# Patient Record
Sex: FEMALE | Race: WHITE | NOT HISPANIC OR LATINO
[De-identification: names, ages, dates, MRNs, and addresses within clinical notes are randomized per-mention and may not be internally consistent; named-entity substitution may affect disease eponyms.]

---

## 2017-07-11 ENCOUNTER — APPOINTMENT (OUTPATIENT)
Dept: INTERNAL MEDICINE | Facility: CLINIC | Age: 72
End: 2017-07-11

## 2017-07-11 VITALS — SYSTOLIC BLOOD PRESSURE: 120 MMHG | DIASTOLIC BLOOD PRESSURE: 74 MMHG

## 2017-07-11 VITALS
DIASTOLIC BLOOD PRESSURE: 78 MMHG | HEIGHT: 67 IN | SYSTOLIC BLOOD PRESSURE: 122 MMHG | OXYGEN SATURATION: 94 % | HEART RATE: 75 BPM | BODY MASS INDEX: 24.64 KG/M2 | TEMPERATURE: 97 F | WEIGHT: 157 LBS

## 2017-07-11 DIAGNOSIS — L65.9 NONSCARRING HAIR LOSS, UNSPECIFIED: ICD-10-CM

## 2017-07-11 DIAGNOSIS — Z80.9 FAMILY HISTORY OF MALIGNANT NEOPLASM, UNSPECIFIED: ICD-10-CM

## 2017-07-11 DIAGNOSIS — L85.3 XEROSIS CUTIS: ICD-10-CM

## 2017-07-11 DIAGNOSIS — Z86.2 PERSONAL HISTORY OF DISEASES OF THE BLOOD AND BLOOD-FORMING ORGANS AND CERTAIN DISORDERS INVOLVING THE IMMUNE MECHANISM: ICD-10-CM

## 2017-07-11 DIAGNOSIS — Z83.49 FAMILY HISTORY OF OTHER ENDOCRINE, NUTRITIONAL AND METABOLIC DISEASES: ICD-10-CM

## 2017-07-11 DIAGNOSIS — Z86.73 PERSONAL HISTORY OF TRANSIENT ISCHEMIC ATTACK (TIA), AND CEREBRAL INFARCTION W/OUT RESIDUAL DEFICITS: ICD-10-CM

## 2017-07-11 DIAGNOSIS — Z86.718 PERSONAL HISTORY OF OTHER VENOUS THROMBOSIS AND EMBOLISM: ICD-10-CM

## 2017-07-11 DIAGNOSIS — Z82.5 FAMILY HISTORY OF ASTHMA AND OTHER CHRONIC LOWER RESPIRATORY DISEASES: ICD-10-CM

## 2017-07-11 DIAGNOSIS — Z86.19 PERSONAL HISTORY OF OTHER INFECTIOUS AND PARASITIC DISEASES: ICD-10-CM

## 2017-07-11 DIAGNOSIS — Z82.69 FAMILY HISTORY OF OTHER DISEASES OF THE MUSCULOSKELETAL SYSTEM AND CONNECTIVE TISSUE: ICD-10-CM

## 2017-07-11 DIAGNOSIS — Z86.711 PERSONAL HISTORY OF PULMONARY EMBOLISM: ICD-10-CM

## 2017-07-11 DIAGNOSIS — Z82.49 FAMILY HISTORY OF ISCHEMIC HEART DISEASE AND OTHER DISEASES OF THE CIRCULATORY SYSTEM: ICD-10-CM

## 2017-07-11 RX ORDER — PRAVASTATIN SODIUM 40 MG/1
40 TABLET ORAL
Qty: 90 | Refills: 1 | Status: ACTIVE | COMMUNITY

## 2017-07-11 RX ORDER — HYDROXYCHLOROQUINE SULFATE 200 MG/1
200 TABLET ORAL TWICE DAILY
Refills: 0 | Status: ACTIVE | COMMUNITY

## 2017-07-11 RX ORDER — OMEGA-3/DHA/EPA/FISH OIL 300-1000MG
1000 CAPSULE ORAL DAILY
Refills: 0 | Status: ACTIVE | COMMUNITY
Start: 2017-07-11

## 2017-07-12 PROBLEM — Z83.49 FAMILY HISTORY OF HYPOTHYROIDISM: Status: ACTIVE | Noted: 2017-07-12

## 2017-07-12 PROBLEM — Z86.718 HISTORY OF DEEP VENOUS THROMBOSIS: Status: RESOLVED | Noted: 2017-07-12 | Resolved: 2017-07-12

## 2017-07-12 PROBLEM — Z82.49 FAMILY HISTORY OF CORONARY ARTERY DISEASE: Status: ACTIVE | Noted: 2017-07-12

## 2017-07-12 PROBLEM — Z82.69 FAMILY HISTORY OF OSTEOARTHRITIS: Status: ACTIVE | Noted: 2017-07-12

## 2017-07-12 PROBLEM — L65.9 HAIR LOSS: Status: RESOLVED | Noted: 2017-07-11 | Resolved: 2017-07-12

## 2017-07-12 PROBLEM — Z86.711 HISTORY OF PULMONARY EMBOLISM: Status: RESOLVED | Noted: 2017-07-12 | Resolved: 2017-07-12

## 2017-07-12 PROBLEM — Z86.2 HISTORY OF BRUISING EASILY: Status: RESOLVED | Noted: 2017-07-11 | Resolved: 2017-07-12

## 2017-07-12 PROBLEM — Z86.19 HISTORY OF HERPES ZOSTER: Status: RESOLVED | Noted: 2017-07-12 | Resolved: 2017-07-12

## 2017-07-12 PROBLEM — Z86.73 HISTORY OF TRANSIENT CEREBRAL ISCHEMIA: Status: RESOLVED | Noted: 2017-07-12 | Resolved: 2017-07-12

## 2017-07-12 PROBLEM — L85.3 DRY SKIN: Status: RESOLVED | Noted: 2017-07-11 | Resolved: 2017-07-12

## 2017-10-19 ENCOUNTER — APPOINTMENT (OUTPATIENT)
Dept: GASTROENTEROLOGY | Facility: CLINIC | Age: 72
End: 2017-10-19
Payer: MEDICARE

## 2017-10-19 ENCOUNTER — APPOINTMENT (OUTPATIENT)
Dept: GASTROENTEROLOGY | Facility: CLINIC | Age: 72
End: 2017-10-19

## 2017-10-19 VITALS
DIASTOLIC BLOOD PRESSURE: 80 MMHG | SYSTOLIC BLOOD PRESSURE: 140 MMHG | BODY MASS INDEX: 24.64 KG/M2 | HEIGHT: 67 IN | WEIGHT: 157 LBS | TEMPERATURE: 98.1 F

## 2017-10-19 PROCEDURE — 99214 OFFICE O/P EST MOD 30 MIN: CPT

## 2017-11-08 ENCOUNTER — APPOINTMENT (OUTPATIENT)
Dept: GASTROENTEROLOGY | Facility: AMBULATORY MEDICAL SERVICES | Age: 72
End: 2017-11-08

## 2018-01-29 ENCOUNTER — APPOINTMENT (OUTPATIENT)
Dept: INTERNAL MEDICINE | Facility: CLINIC | Age: 73
End: 2018-01-29
Payer: MEDICARE

## 2018-01-29 ENCOUNTER — NON-APPOINTMENT (OUTPATIENT)
Age: 73
End: 2018-01-29

## 2018-01-29 VITALS
SYSTOLIC BLOOD PRESSURE: 126 MMHG | TEMPERATURE: 97.8 F | WEIGHT: 162 LBS | DIASTOLIC BLOOD PRESSURE: 70 MMHG | BODY MASS INDEX: 25.43 KG/M2 | HEIGHT: 67 IN

## 2018-01-29 VITALS — SYSTOLIC BLOOD PRESSURE: 116 MMHG | DIASTOLIC BLOOD PRESSURE: 70 MMHG

## 2018-01-29 DIAGNOSIS — R22.41 LOCALIZED SWELLING, MASS AND LUMP, RIGHT LOWER LIMB: ICD-10-CM

## 2018-01-29 DIAGNOSIS — R10.13 EPIGASTRIC PAIN: ICD-10-CM

## 2018-01-29 PROCEDURE — 99214 OFFICE O/P EST MOD 30 MIN: CPT | Mod: 25

## 2018-01-29 PROCEDURE — 93000 ELECTROCARDIOGRAM COMPLETE: CPT

## 2018-03-13 ENCOUNTER — APPOINTMENT (OUTPATIENT)
Dept: INTERNAL MEDICINE | Facility: CLINIC | Age: 73
End: 2018-03-13
Payer: MEDICARE

## 2018-03-13 VITALS — DIASTOLIC BLOOD PRESSURE: 60 MMHG | SYSTOLIC BLOOD PRESSURE: 98 MMHG

## 2018-03-13 VITALS
HEART RATE: 81 BPM | DIASTOLIC BLOOD PRESSURE: 70 MMHG | TEMPERATURE: 98 F | BODY MASS INDEX: 25.58 KG/M2 | HEIGHT: 67 IN | WEIGHT: 163 LBS | SYSTOLIC BLOOD PRESSURE: 120 MMHG | OXYGEN SATURATION: 93 %

## 2018-03-13 PROCEDURE — 99214 OFFICE O/P EST MOD 30 MIN: CPT

## 2018-03-13 PROCEDURE — 99204 OFFICE O/P NEW MOD 45 MIN: CPT

## 2018-03-13 RX ORDER — MULTIVITAMIN/IRON/FOLIC ACID 18MG-0.4MG
600-400 TABLET ORAL TWICE DAILY
Refills: 0 | Status: ACTIVE | COMMUNITY
Start: 2017-07-11

## 2018-03-13 RX ORDER — CHLORHEXIDINE GLUCONATE 4 %
LIQUID (ML) TOPICAL DAILY
Qty: 90 | Refills: 1 | Status: ACTIVE | COMMUNITY
Start: 2018-03-13

## 2018-07-30 ENCOUNTER — APPOINTMENT (OUTPATIENT)
Dept: INTERNAL MEDICINE | Facility: CLINIC | Age: 73
End: 2018-07-30
Payer: MEDICARE

## 2018-07-30 VITALS
SYSTOLIC BLOOD PRESSURE: 120 MMHG | HEART RATE: 82 BPM | TEMPERATURE: 98 F | OXYGEN SATURATION: 94 % | HEIGHT: 67 IN | BODY MASS INDEX: 25.11 KG/M2 | DIASTOLIC BLOOD PRESSURE: 76 MMHG | WEIGHT: 160 LBS

## 2018-07-30 DIAGNOSIS — Z86.69 PERSONAL HISTORY OF OTHER DISEASES OF THE NERVOUS SYSTEM AND SENSE ORGANS: ICD-10-CM

## 2018-07-30 PROCEDURE — 99214 OFFICE O/P EST MOD 30 MIN: CPT

## 2018-07-30 RX ORDER — VALSARTAN AND HYDROCHLOROTHIAZIDE 160; 12.5 MG/1; MG/1
160-12.5 TABLET, FILM COATED ORAL DAILY
Refills: 0 | Status: DISCONTINUED | COMMUNITY
End: 2018-07-30

## 2018-07-30 RX ORDER — LOTEPREDNOL ETABONATE 5 MG/G
0.5 GEL OPHTHALMIC
Qty: 5 | Refills: 0 | Status: DISCONTINUED | COMMUNITY
Start: 2018-02-19 | End: 2018-07-30

## 2018-07-30 RX ORDER — MOMETASONE FUROATE AND FORMOTEROL FUMARATE DIHYDRATE 200; 5 UG/1; UG/1
200-5 AEROSOL RESPIRATORY (INHALATION) TWICE DAILY
Refills: 0 | Status: DISCONTINUED | COMMUNITY
End: 2018-07-30

## 2018-07-30 RX ORDER — BESIFLOXACIN 6 MG/ML
0.6 SUSPENSION OPHTHALMIC
Qty: 5 | Refills: 0 | Status: DISCONTINUED | COMMUNITY
Start: 2018-02-19 | End: 2018-07-30

## 2018-07-30 RX ORDER — TROPICAMIDE 10 MG/ML
1 SOLUTION/ DROPS OPHTHALMIC
Qty: 15 | Refills: 0 | Status: DISCONTINUED | COMMUNITY
Start: 2018-01-30 | End: 2018-07-30

## 2018-07-31 PROBLEM — Z86.69 HISTORY OF CATARACT: Status: RESOLVED | Noted: 2018-01-29 | Resolved: 2018-07-31

## 2018-07-31 RX ORDER — FLUTICASONE FUROATE AND VILANTEROL TRIFENATATE 200; 25 UG/1; UG/1
200-25 POWDER RESPIRATORY (INHALATION) DAILY
Refills: 0 | Status: ACTIVE | COMMUNITY
Start: 2018-07-30

## 2018-07-31 RX ORDER — IRBESARTAN AND HYDROCHLOROTHIAZIDE 150; 12.5 MG/1; MG/1
150-12.5 TABLET ORAL DAILY
Refills: 0 | Status: ACTIVE | COMMUNITY
Start: 2018-07-31

## 2018-07-31 NOTE — HISTORY OF PRESENT ILLNESS
[de-identified] : Pt presented for periop management of tooth extraction.  The dentist treated pt for dental infection, but felt that she needs 3 teeth extracted.  Pt was treated with Amoxicillin for 7 days 3 weeks ago, and completed ABx 2 weeks ago.  After ABx, PT/INR was very high, and had to stop Coumadin for 1 day.  Recent INR level was back down to 2.4.  However, the dentist is concerned about oral candidiasis.

## 2018-07-31 NOTE — PHYSICAL EXAM
[No Acute Distress] : no acute distress [Well Nourished] : well nourished [Well Developed] : well developed [Normal Sclera/Conjunctiva] : normal sclera/conjunctiva [PERRL] : pupils equal round and reactive to light [EOMI] : extraocular movements intact [Normal TMs] : both tympanic membranes were normal [Normal Nasal Mucosa] : the nasal mucosa was normal [No JVD] : no jugular venous distention [Supple] : supple [No Lymphadenopathy] : no lymphadenopathy [No Respiratory Distress] : no respiratory distress  [Clear to Auscultation] : lungs were clear to auscultation bilaterally [Normal Rate] : normal rate  [Regular Rhythm] : with a regular rhythm [Normal S1, S2] : normal S1 and S2 [No Carotid Bruits] : no carotid bruits [No Edema] : there was no peripheral edema [No Extremity Clubbing/Cyanosis] : no extremity clubbing/cyanosis [Soft] : abdomen soft [Non Tender] : non-tender [Normal Bowel Sounds] : normal bowel sounds [Normal Supraclavicular Nodes] : no supraclavicular lymphadenopathy [Normal Posterior Cervical Nodes] : no posterior cervical lymphadenopathy [Normal Anterior Cervical Nodes] : no anterior cervical lymphadenopathy [No CVA Tenderness] : no CVA  tenderness [No Spinal Tenderness] : no spinal tenderness [No Joint Swelling] : no joint swelling [Grossly Normal Strength/Tone] : grossly normal strength/tone [Normal Gait] : normal gait [Coordination Grossly Intact] : coordination grossly intact [No Focal Deficits] : no focal deficits [Speech Grossly Normal] : speech grossly normal [Normal Affect] : the affect was normal [Alert and Oriented x3] : oriented to person, place, and time [Normal Mood] : the mood was normal [de-identified] : female in stated age,  [de-identified] : Pt has evidence of oral thrush on palate,

## 2018-07-31 NOTE — ASSESSMENT
[FreeTextEntry1] : 72 year old female with PMH of APL Syndrome on AC, HTN, allergic rhinitis and COPD presented for medical clearance in order to have extraction of 3 teeth.  Patient was treated with antibiotics recently for dental infection, which resulted in supra-therapeutic INR.  Patient was also noted to have candidiasis on her palate by the dentist and needed to be treated for that.\par \par On exam, she has evidence of oral candidiasis.  Her risk factor is Breo inhaler, nasal steroid and recent antibiotics.  I will treat her with clotrimazole troches for 7-14 days,  Oral care was also discussed with the patient.\par \par APL Syndrome with SLE,  h/o PE, DVT and TIA.  Patient has been dong well on AC with Warfarin//Jantoven.  However, her INR was supra-therapeutic after a course of antibiotics to treat dental infection.  Her INR dropped down to therapeutic level after she went off of antibiotics, Her last INR was 2.4, patient may go for dental extraction as long as her INR was less than 2.5.  Pt is going on an antifungal medication and I'm concerned that this will raise her INR again.   I suggested that the patient completes her antifungal medication.  If the oral candidiasis resolves, then she should have repeat PT/INR checked, and as long as her INR is below 2.5, pt may proceed with dental extraction.  Patient was also advised to hold off on fish oil and MVI 1 week prior to surgery.  She will continue on Plaquenil  as well.\par \par Hypertension, h/o non-critical CAD and RBBB,her BP is well controlled on current meds.  Her BP medication was recently changed by the cardiologist, because there was a recall of the previous antihypertensive medication. \par \par Allergic rhinitis and COPD, stable on current medication.

## 2018-08-16 ENCOUNTER — APPOINTMENT (OUTPATIENT)
Dept: INTERNAL MEDICINE | Facility: CLINIC | Age: 73
End: 2018-08-16
Payer: MEDICARE

## 2018-08-16 VITALS
WEIGHT: 157 LBS | HEART RATE: 82 BPM | BODY MASS INDEX: 24.64 KG/M2 | HEIGHT: 67 IN | TEMPERATURE: 97.7 F | OXYGEN SATURATION: 96 % | SYSTOLIC BLOOD PRESSURE: 126 MMHG | DIASTOLIC BLOOD PRESSURE: 80 MMHG

## 2018-08-16 VITALS — DIASTOLIC BLOOD PRESSURE: 80 MMHG | SYSTOLIC BLOOD PRESSURE: 122 MMHG

## 2018-08-16 DIAGNOSIS — Z01.818 ENCOUNTER FOR OTHER PREPROCEDURAL EXAMINATION: ICD-10-CM

## 2018-08-16 DIAGNOSIS — R92.2 INCONCLUSIVE MAMMOGRAM: ICD-10-CM

## 2018-08-16 LAB
BILIRUB UR QL STRIP: NEGATIVE
CLARITY UR: CLEAR
COLLECTION METHOD: NORMAL
GLUCOSE UR-MCNC: NEGATIVE
HCG UR QL: 0.2 EU/DL
HGB UR QL STRIP.AUTO: NEGATIVE
KETONES UR-MCNC: NEGATIVE
LEUKOCYTE ESTERASE UR QL STRIP: NEGATIVE
NITRITE UR QL STRIP: NEGATIVE
PH UR STRIP: 7
PROT UR STRIP-MCNC: NEGATIVE
SP GR UR STRIP: >1.03

## 2018-08-16 PROCEDURE — 81003 URINALYSIS AUTO W/O SCOPE: CPT | Mod: QW

## 2018-08-16 PROCEDURE — 82270 OCCULT BLOOD FECES: CPT

## 2018-08-16 PROCEDURE — G0439: CPT

## 2018-08-16 NOTE — HISTORY OF PRESENT ILLNESS
[de-identified] : Pt presented for PE.  Last PE was 12/2016. Pt also had both cataract surgeries earlier this year.  Pt also had lipoma removed from the back of R thigh 7/2017.\par \par Pt was treated with Mycelex troches for 14 days, and it has improved, but not completely cleared.\par \par Pt has been nauseas lately since she started the troches.  She also has some  RUQ pain for the past 1=2 meals

## 2018-08-16 NOTE — PHYSICAL EXAM
[No Acute Distress] : no acute distress [Well Nourished] : well nourished [Well Developed] : well developed [Normal Sclera/Conjunctiva] : normal sclera/conjunctiva [PERRL] : pupils equal round and reactive to light [EOMI] : extraocular movements intact [Normal Outer Ear/Nose] : the outer ears and nose were normal in appearance [Normal TMs] : both tympanic membranes were normal [Normal Nasal Mucosa] : the nasal mucosa was normal [No JVD] : no jugular venous distention [Supple] : supple [No Lymphadenopathy] : no lymphadenopathy [No Respiratory Distress] : no respiratory distress  [Clear to Auscultation] : lungs were clear to auscultation bilaterally [No Accessory Muscle Use] : no accessory muscle use [Normal Rate] : normal rate  [Regular Rhythm] : with a regular rhythm [Normal S1, S2] : normal S1 and S2 [No Carotid Bruits] : no carotid bruits [Pedal Pulses Present] : the pedal pulses are present [No Edema] : there was no peripheral edema [No Extremity Clubbing/Cyanosis] : no extremity clubbing/cyanosis [Normal Appearance] : normal in appearance [No Masses] : no palpable masses [No Nipple Discharge] : no nipple discharge [No Axillary Lymphadenopathy] : no axillary lymphadenopathy [Soft] : abdomen soft [Non Tender] : non-tender [Non-distended] : non-distended [Normal Bowel Sounds] : normal bowel sounds [Normal Sphincter Tone] : normal sphincter tone [No Mass] : no mass [Stool Occult Blood] : no occult stool [Normal Supraclavicular Nodes] : no supraclavicular lymphadenopathy [Normal Axillary Nodes] : no axillary lymphadenopathy [Normal Posterior Cervical Nodes] : no posterior cervical lymphadenopathy [Normal Anterior Cervical Nodes] : no anterior cervical lymphadenopathy [No CVA Tenderness] : no CVA  tenderness [No Spinal Tenderness] : no spinal tenderness [No Joint Swelling] : no joint swelling [Grossly Normal Strength/Tone] : grossly normal strength/tone [No Rash] : no rash [Normal Gait] : normal gait [Coordination Grossly Intact] : coordination grossly intact [No Focal Deficits] : no focal deficits [Speech Grossly Normal] : speech grossly normal [Normal Affect] : the affect was normal [Alert and Oriented x3] : oriented to person, place, and time [Normal Mood] : the mood was normal [de-identified] : female in stated age,  [de-identified] : Pt has very minimal thrush on her tongue, herpharynx was unremarkable,

## 2018-08-16 NOTE — REVIEW OF SYSTEMS
[Abdominal Pain] : abdominal pain [Nausea] : nausea [Diarrhea] : diarrhea [Joint Pain] : joint pain [Back Pain] : back pain [Easy Bruising] : easy bruising [Negative] : Heme/Lymph [Fever] : no fever [Chills] : no chills [Fatigue] : no fatigue [Recent Change In Weight] : ~T no recent weight change [Chest Pain] : no chest pain [Palpitations] : no palpitations [Lower Ext Edema] : no lower extremity edema [Shortness Of Breath] : no shortness of breath [Wheezing] : no wheezing [Cough] : no cough [Dyspnea on Exertion] : no dyspnea on exertion [Constipation] : no constipation [Vomiting] : no vomiting [Heartburn] : no heartburn [Melena] : no melena [Dysuria] : no dysuria [Incontinence] : no incontinence [Hematuria] : no hematuria [Joint Stiffness] : no joint stiffness [Joint Swelling] : no joint swelling [Muscle Pain] : no muscle pain [Itching] : no itching [Mole Changes] : no mole changes [Skin Rash] : no skin rash [Headache] : no headache [Dizziness] : no dizziness [Fainting] : no fainting [Unsteady Walking] : no ataxia [Insomnia] : no insomnia [Anxiety] : no anxiety [Depression] : no depression [Easy Bleeding] : no easy bleeding [FreeTextEntry3] : wears corrective lens [FreeTextEntry7] : As in HPI, [FreeTextEntry9] : Pt with multiple joint pain, she also has pain on achilles tendon, she saw podiatrist and had an injection as well as a boot that helps with the pain.  Pt has chronic back pain. [de-identified] : Pt is on warfarin,

## 2018-08-16 NOTE — DATA REVIEWED
[FreeTextEntry1] : Derm - 8/2017\par \par EKG - deferred, done on 6/18/2018 with cardio,\par \par Labs 8/11/2018 reviewed - PT/INR - 16.5/1.6\par                                             Excellent cholesterol panel, normal CPK,\par                                             ESR - 2, CRP - 2.6,\par                                             Vitamin D - 44,\par                                             the rest of labs were unremarkable.

## 2018-08-16 NOTE — ASSESSMENT
[FreeTextEntry1] : 72-year-old female presented for physical exam. \par \par The patient has a history of  SLE and antiphospholipid syndrome, TIA, PE and DVT on long-term anticoagulation, her condition is followed by rheumatology, and they also monitor her pro time and INR to adjust her Coumadin level. A recent INR was subtherapeutic, the rheumatologist recommended not changing her dose because she was on an antifungal medication for a short while. The patient was advised to repeat labs in a week.\par \par COPD, her condition is relatively stable on inhaler therapy, and she is followed closely by her pulmonologist.\par \par Oral candidiasis, the patient was recently seen and treated by  myself for oral candidiasis, after she was referred here by her dentist. The patient has completed a course of clotrimazole  troches, and her exam showed that the thrush are pretty much resolved. The patient was advised that she may return to her dentist for dental work.\par \par Patient complains of nausea and some right upper quadrant pain. Patient feels that her nausea was worse after she was started on the antifungal medication. It is possible that the nausea may be iatrogenic. Her symptoms did not improve even though she has completed the medication 4 days ago. On further review, the patient acknowledged that she's had nausea a lot longer, she also had some RUQ pain.  Patient had abd US with GI in 10/2017 that was unremarkable.  She was recommended to have EGD/colonosxcopy, but she hasn't made arrangement to have it done yet, as she needs to go off her Warfarin in order to get it done.  Given recent oral candidiasis, and now persistent nausea, as well as ROBERTO CARLOS pain, pt need to follow up with GI for the above procedures.  She needs to be r/o'ed for candidal esophagitis as well.\par \par Hypertension, hyperlipidemia, history of mild CAD and RBBB. The patient's blood pressure was well-controlled on current medications, her cholesterol levels were excellent. She has a normal chemistry. The patient will continue the current medication.\par \par The patient is overdue for mammogram with breast ultrasound, prescription was given to her to repeat the tests. Patient is also overdue for a Pap smear, she hasn't had the test for many years. I gave her the name of gynecologists in the area. Patient is also overdue for a colonoscopy as above, she will followup with GI. Patient is up-to-date with her bone density test. She was reminded to have routine eye exam, dental care and skin exam with the dermatologist.

## 2018-08-16 NOTE — HEALTH RISK ASSESSMENT
[Very Good] : ~his/her~ current health as very good [Excellent] : ~his/her~  mood as  excellent [No falls in past year] : Patient reported no falls in the past year [0] : 2) Feeling down, depressed, or hopeless: Not at all (0) [None] : None [With Family] : lives with family [# of Members in Household ___] :  household currently consist of [unfilled] member(s) [Retired] : retired [High School] : high school [] :  [# Of Children ___] : has [unfilled] children [Feels Safe at Home] : Feels safe at home [Fully functional (bathing, dressing, toileting, transferring, walking, feeding)] : Fully functional (bathing, dressing, toileting, transferring, walking, feeding) [Fully functional (using the telephone, shopping, preparing meals, housekeeping, doing laundry, using] : Fully functional and needs no help or supervision to perform IADLs (using the telephone, shopping, preparing meals, housekeeping, doing laundry, using transportation, managing medications and managing finances) [Discussed at today's visit] : Advance Directives Discussed at today's visit [] : No [Change in mental status noted] : No change in mental status noted [Reports changes in hearing] : Reports no changes in hearing [Reports changes in vision] : Reports no changes in vision [Reports changes in dental health] : Reports no changes in dental health [MammogramDate] : 02/17 [MammogramComments] : Breast US - 2/2017 [PapSmearDate] : >10 years [BoneDensityDate] : 05/18 [ColonoscopyDate] : 5/2012 [ColonoscopyComments] : 5/2014 [de-identified] : eye exam - 3/2018 [de-identified] : dentist - 2017

## 2018-09-14 ENCOUNTER — APPOINTMENT (OUTPATIENT)
Dept: GASTROENTEROLOGY | Facility: CLINIC | Age: 73
End: 2018-09-14
Payer: MEDICARE

## 2018-09-14 VITALS
DIASTOLIC BLOOD PRESSURE: 80 MMHG | WEIGHT: 160 LBS | HEIGHT: 67 IN | TEMPERATURE: 98 F | HEART RATE: 77 BPM | BODY MASS INDEX: 25.11 KG/M2 | SYSTOLIC BLOOD PRESSURE: 120 MMHG | OXYGEN SATURATION: 93 %

## 2018-09-14 PROCEDURE — 99214 OFFICE O/P EST MOD 30 MIN: CPT

## 2018-10-16 ENCOUNTER — APPOINTMENT (OUTPATIENT)
Dept: GASTROENTEROLOGY | Facility: AMBULATORY MEDICAL SERVICES | Age: 73
End: 2018-10-16

## 2018-11-07 ENCOUNTER — APPOINTMENT (OUTPATIENT)
Dept: GASTROENTEROLOGY | Facility: CLINIC | Age: 73
End: 2018-11-07
Payer: MEDICARE

## 2018-11-07 VITALS
BODY MASS INDEX: 24.48 KG/M2 | HEIGHT: 67 IN | OXYGEN SATURATION: 94 % | SYSTOLIC BLOOD PRESSURE: 120 MMHG | DIASTOLIC BLOOD PRESSURE: 70 MMHG | WEIGHT: 156 LBS | HEART RATE: 87 BPM | TEMPERATURE: 98 F

## 2018-11-07 PROCEDURE — 99213 OFFICE O/P EST LOW 20 MIN: CPT

## 2018-11-15 ENCOUNTER — APPOINTMENT (OUTPATIENT)
Dept: GASTROENTEROLOGY | Facility: AMBULATORY MEDICAL SERVICES | Age: 73
End: 2018-11-15

## 2018-11-28 ENCOUNTER — APPOINTMENT (OUTPATIENT)
Dept: INTERNAL MEDICINE | Facility: CLINIC | Age: 73
End: 2018-11-28
Payer: MEDICARE

## 2018-11-28 VITALS
OXYGEN SATURATION: 96 % | TEMPERATURE: 97.9 F | HEIGHT: 67 IN | BODY MASS INDEX: 24.64 KG/M2 | DIASTOLIC BLOOD PRESSURE: 70 MMHG | SYSTOLIC BLOOD PRESSURE: 120 MMHG | HEART RATE: 80 BPM | WEIGHT: 157 LBS

## 2018-11-28 DIAGNOSIS — J30.9 ALLERGIC RHINITIS, UNSPECIFIED: ICD-10-CM

## 2018-11-28 PROCEDURE — 99213 OFFICE O/P EST LOW 20 MIN: CPT

## 2018-11-28 RX ORDER — FLUTICASONE PROPIONATE 50 UG/1
50 SPRAY, METERED NASAL
Qty: 1 | Refills: 2 | Status: DISCONTINUED | COMMUNITY
Start: 2017-07-11 | End: 2018-11-28

## 2018-11-28 NOTE — HISTORY OF PRESENT ILLNESS
[FreeTextEntry8] : Pt returned for persistent oral thrush, she felt that it never went away after treatment in 8/2018.  She was treated with Mycelex in 8/2018.  She believes it got worse about a month ago.\par \par Pt has a slight sore throat now, as she thinks she has a URI.  She is taking Claritin and Flonase NS with adequate control of symptoms.

## 2018-11-28 NOTE — PHYSICAL EXAM
[No Acute Distress] : no acute distress [Well Nourished] : well nourished [Well Developed] : well developed [Normal Sclera/Conjunctiva] : normal sclera/conjunctiva [PERRL] : pupils equal round and reactive to light [EOMI] : extraocular movements intact [Normal TMs] : both tympanic membranes were normal [No JVD] : no jugular venous distention [Supple] : supple [No Respiratory Distress] : no respiratory distress  [Clear to Auscultation] : lungs were clear to auscultation bilaterally [Normal Rate] : normal rate  [Regular Rhythm] : with a regular rhythm [Normal S1, S2] : normal S1 and S2 [No Edema] : there was no peripheral edema [No Extremity Clubbing/Cyanosis] : no extremity clubbing/cyanosis [Soft] : abdomen soft [Non Tender] : non-tender [Normal Bowel Sounds] : normal bowel sounds [Normal Supraclavicular Nodes] : no supraclavicular lymphadenopathy [Normal Posterior Cervical Nodes] : no posterior cervical lymphadenopathy [Normal Anterior Cervical Nodes] : no anterior cervical lymphadenopathy [No Joint Swelling] : no joint swelling [Grossly Normal Strength/Tone] : grossly normal strength/tone [de-identified] : female in stated age,  [de-identified] : no sinus tenderness, nasal turbinates were congested, the pharynx was not erythematous, but has postnasal drip. There was no evidence of oral thrush on exam.

## 2018-11-28 NOTE — ASSESSMENT
[FreeTextEntry1] : This 73-year-old female with past medical history is allergic rhinitis on Flonase NS, COPD on Breo and SLE on Plaquenil.  Patient presented for evaluation of possible oral thrush. The patient was diagnosed with oral candidiasis a couple of months ago by her dentist, she was treated with Mycelex troches, and she was worried that she may have recurrence, because she did not take it correctly the first couple of days. She felt that she never cleared for oral thrush completely.\par \par On exam, the patient actually has no evidence of an oral thrush. She does have a mild URI, and has nasal congestion as well as some postnasal drip. She is using her Flonase nasal spray, Breo and is on Plaquenil.\par \par I assured the patient that she does not have oral thrush on exam and she does not need another course of treatment. I will not make any adjustments to her COPD and her lupus treatment. However, I advised changing the Flonase nasal spray, which has some steroids to Azelastine nasal spray.  She may continue Claritin as needed.\par \par Patient wants to use a different inhaler treatment, I advised her to followup with her pulmonologist to discuss that.\par

## 2018-12-24 ENCOUNTER — RX RENEWAL (OUTPATIENT)
Age: 73
End: 2018-12-24

## 2018-12-26 ENCOUNTER — RX RENEWAL (OUTPATIENT)
Age: 73
End: 2018-12-26

## 2018-12-26 RX ORDER — AZELASTINE HYDROCHLORIDE 137 UG/1
0.1 SPRAY, METERED NASAL
Qty: 3 | Refills: 1 | Status: ACTIVE | COMMUNITY
Start: 2018-11-28 | End: 1900-01-01

## 2019-06-12 ENCOUNTER — APPOINTMENT (OUTPATIENT)
Dept: GASTROENTEROLOGY | Facility: CLINIC | Age: 74
End: 2019-06-12
Payer: MEDICARE

## 2019-06-12 VITALS
SYSTOLIC BLOOD PRESSURE: 142 MMHG | HEIGHT: 67 IN | TEMPERATURE: 98.1 F | DIASTOLIC BLOOD PRESSURE: 80 MMHG | HEART RATE: 75 BPM | BODY MASS INDEX: 25.43 KG/M2 | WEIGHT: 162 LBS | OXYGEN SATURATION: 96 %

## 2019-06-12 DIAGNOSIS — Z12.11 ENCOUNTER FOR SCREENING FOR MALIGNANT NEOPLASM OF COLON: ICD-10-CM

## 2019-06-12 PROCEDURE — 99214 OFFICE O/P EST MOD 30 MIN: CPT

## 2019-06-12 NOTE — ASSESSMENT
[FreeTextEntry1] : Impression and plan\par \par Patient came today to arrange colonoscopy. The risks benefits alternatives and limitations of procedure were discussed. Patient has a history of antiphospholipid syndrome with associated lupus and we will plan to hold anticoagulation for 5 days prior to the procedure she has done this in the past. Further suggestions will follow after above is completed.

## 2019-06-12 NOTE — PHYSICAL EXAM
[General Appearance - Alert] : alert [General Appearance - In No Acute Distress] : in no acute distress [Sclera] : the sclera and conjunctiva were normal [PERRL With Normal Accommodation] : pupils were equal in size, round, and reactive to light [Extraocular Movements] : extraocular movements were intact [Outer Ear] : the ears and nose were normal in appearance [Oropharynx] : the oropharynx was normal [Neck Appearance] : the appearance of the neck was normal [Neck Cervical Mass (___cm)] : no neck mass was observed [Jugular Venous Distention Increased] : there was no jugular-venous distention [Thyroid Diffuse Enlargement] : the thyroid was not enlarged [Thyroid Nodule] : there were no palpable thyroid nodules [Auscultation Breath Sounds / Voice Sounds] : lungs were clear to auscultation bilaterally [Heart Rate And Rhythm] : heart rate was normal and rhythm regular [Heart Sounds] : normal S1 and S2 [Heart Sounds Gallop] : no gallops [Murmurs] : no murmurs [Heart Sounds Pericardial Friction Rub] : no pericardial rub [Edema] : there was no peripheral edema [Bowel Sounds] : normal bowel sounds [Abdomen Soft] : soft [Abdomen Tenderness] : non-tender [Abdomen Mass (___ Cm)] : no abdominal mass palpated [Cervical Lymph Nodes Enlarged Anterior Bilaterally] : anterior cervical [Cervical Lymph Nodes Enlarged Posterior Bilaterally] : posterior cervical [Supraclavicular Lymph Nodes Enlarged Bilaterally] : supraclavicular [No CVA Tenderness] : no ~M costovertebral angle tenderness [No Spinal Tenderness] : no spinal tenderness [Nail Clubbing] : no clubbing  or cyanosis of the fingernails [Abnormal Walk] : normal gait [Musculoskeletal - Swelling] : no joint swelling seen [Motor Tone] : muscle strength and tone were normal [Skin Color & Pigmentation] : normal skin color and pigmentation [Skin Turgor] : normal skin turgor [] : no rash [Deep Tendon Reflexes (DTR)] : deep tendon reflexes were 2+ and symmetric [No Focal Deficits] : no focal deficits [Sensation] : the sensory exam was normal to light touch and pinprick

## 2019-06-12 NOTE — HISTORY OF PRESENT ILLNESS
[FreeTextEntry1] : Patient returns today to arrange for colonoscopy. She has been seen here approximately 9 months ago at which time colonoscopy was to be scheduled. Patient canceled secondary to some pulmonary complaints and some right chest discomfort which has since resolved. Patient is currently doing fairly well she follows with pulmonary regularly and has gotten a recent good report. She currently denies nausea vomiting fever chills rectal bleeding or melena. She does have occasional constipation and gassy bloating which responds to probiotic. Cardiac and pulmonary systems are in good control at this time.

## 2019-07-17 ENCOUNTER — APPOINTMENT (OUTPATIENT)
Dept: GASTROENTEROLOGY | Facility: AMBULATORY MEDICAL SERVICES | Age: 74
End: 2019-07-17

## 2020-02-26 ENCOUNTER — LABORATORY RESULT (OUTPATIENT)
Age: 75
End: 2020-02-26

## 2020-03-06 ENCOUNTER — LABORATORY RESULT (OUTPATIENT)
Age: 75
End: 2020-03-06

## 2020-03-06 ENCOUNTER — NON-APPOINTMENT (OUTPATIENT)
Age: 75
End: 2020-03-06

## 2020-03-06 ENCOUNTER — APPOINTMENT (OUTPATIENT)
Dept: INTERNAL MEDICINE | Facility: CLINIC | Age: 75
End: 2020-03-06
Payer: MEDICARE

## 2020-03-06 VITALS
HEIGHT: 67 IN | TEMPERATURE: 97.7 F | DIASTOLIC BLOOD PRESSURE: 62 MMHG | OXYGEN SATURATION: 95 % | HEART RATE: 68 BPM | BODY MASS INDEX: 25.74 KG/M2 | WEIGHT: 164 LBS | SYSTOLIC BLOOD PRESSURE: 130 MMHG

## 2020-03-06 VITALS — SYSTOLIC BLOOD PRESSURE: 136 MMHG | DIASTOLIC BLOOD PRESSURE: 78 MMHG

## 2020-03-06 DIAGNOSIS — Z87.898 PERSONAL HISTORY OF OTHER SPECIFIED CONDITIONS: ICD-10-CM

## 2020-03-06 LAB
ALBUMIN SERPL ELPH-MCNC: 4.5 G/DL
ALP BLD-CCNC: 50 U/L
ALT SERPL-CCNC: 23 U/L
ANION GAP SERPL CALC-SCNC: 14 MMOL/L
APPEARANCE: CLEAR
AST SERPL-CCNC: 31 U/L
BASOPHILS # BLD AUTO: 0.04 K/UL
BASOPHILS NFR BLD AUTO: 0.7 %
BILIRUB SERPL-MCNC: 0.7 MG/DL
BILIRUBIN URINE: NEGATIVE
BLOOD URINE: NEGATIVE
BUN SERPL-MCNC: 14 MG/DL
CALCIUM SERPL-MCNC: 9.9 MG/DL
CHLORIDE SERPL-SCNC: 101 MMOL/L
CHOLEST SERPL-MCNC: 158 MG/DL
CHOLEST/HDLC SERPL: 1.8 RATIO
CK SERPL-CCNC: 144 U/L
CO2 SERPL-SCNC: 27 MMOL/L
COLOR: NORMAL
CREAT SERPL-MCNC: 0.75 MG/DL
EOSINOPHIL # BLD AUTO: 0.13 K/UL
EOSINOPHIL NFR BLD AUTO: 2.2 %
GLUCOSE QUALITATIVE U: NEGATIVE
GLUCOSE SERPL-MCNC: 90 MG/DL
HCT VFR BLD CALC: 41.5 %
HDLC SERPL-MCNC: 88 MG/DL
HGB BLD-MCNC: 12.9 G/DL
IMM GRANULOCYTES NFR BLD AUTO: 0.3 %
KETONES URINE: NEGATIVE
LDLC SERPL CALC-MCNC: 53 MG/DL
LEUKOCYTE ESTERASE URINE: ABNORMAL
LYMPHOCYTES # BLD AUTO: 2.06 K/UL
LYMPHOCYTES NFR BLD AUTO: 34.7 %
MAN DIFF?: NORMAL
MCHC RBC-ENTMCNC: 30.1 PG
MCHC RBC-ENTMCNC: 31.1 GM/DL
MCV RBC AUTO: 97 FL
MONOCYTES # BLD AUTO: 0.68 K/UL
MONOCYTES NFR BLD AUTO: 11.4 %
NEUTROPHILS # BLD AUTO: 3.01 K/UL
NEUTROPHILS NFR BLD AUTO: 50.7 %
NITRITE URINE: NEGATIVE
PH URINE: 6.5
PLATELET # BLD AUTO: 198 K/UL
POTASSIUM SERPL-SCNC: 4 MMOL/L
PROT SERPL-MCNC: 6.5 G/DL
PROTEIN URINE: NEGATIVE
RBC # BLD: 4.28 M/UL
RBC # FLD: 13.5 %
SODIUM SERPL-SCNC: 142 MMOL/L
SPECIFIC GRAVITY URINE: 1.01
T3RU NFR SERPL: 1 TBI
T4 FREE SERPL-MCNC: 1.5 NG/DL
T4 SERPL-MCNC: 7.5 UG/DL
TRIGL SERPL-MCNC: 86 MG/DL
TSH SERPL-ACNC: 2.81 UIU/ML
UROBILINOGEN URINE: NORMAL
WBC # FLD AUTO: 5.94 K/UL

## 2020-03-06 PROCEDURE — G0444 DEPRESSION SCREEN ANNUAL: CPT | Mod: 59

## 2020-03-06 PROCEDURE — 93000 ELECTROCARDIOGRAM COMPLETE: CPT | Mod: 59

## 2020-03-06 PROCEDURE — G0439: CPT

## 2020-03-06 PROCEDURE — 82270 OCCULT BLOOD FECES: CPT

## 2020-03-06 NOTE — PHYSICAL EXAM
[No Acute Distress] : no acute distress [Well Nourished] : well nourished [Well Developed] : well developed [Normal Sclera/Conjunctiva] : normal sclera/conjunctiva [PERRL] : pupils equal round and reactive to light [EOMI] : extraocular movements intact [Normal Outer Ear/Nose] : the outer ears and nose were normal in appearance [Normal Oropharynx] : the oropharynx was normal [Normal TMs] : both tympanic membranes were normal [Normal Nasal Mucosa] : the nasal mucosa was normal [No JVD] : no jugular venous distention [No Lymphadenopathy] : no lymphadenopathy [Supple] : supple [No Respiratory Distress] : no respiratory distress  [Clear to Auscultation] : lungs were clear to auscultation bilaterally [Normal Rate] : normal rate  [Regular Rhythm] : with a regular rhythm [Normal S1, S2] : normal S1 and S2 [No Carotid Bruits] : no carotid bruits [Pedal Pulses Present] : the pedal pulses are present [No Edema] : there was no peripheral edema [No Extremity Clubbing/Cyanosis] : no extremity clubbing/cyanosis [Normal Appearance] : normal in appearance [No Masses] : no palpable masses [No Nipple Discharge] : no nipple discharge [No Axillary Lymphadenopathy] : no axillary lymphadenopathy [Soft] : abdomen soft [Non Tender] : non-tender [Non-distended] : non-distended [Normal Bowel Sounds] : normal bowel sounds [Normal Sphincter Tone] : normal sphincter tone [No Mass] : no mass [Normal Supraclavicular Nodes] : no supraclavicular lymphadenopathy [Normal Axillary Nodes] : no axillary lymphadenopathy [Normal Posterior Cervical Nodes] : no posterior cervical lymphadenopathy [Normal Anterior Cervical Nodes] : no anterior cervical lymphadenopathy [No CVA Tenderness] : no CVA  tenderness [No Spinal Tenderness] : no spinal tenderness [No Joint Swelling] : no joint swelling [Grossly Normal Strength/Tone] : grossly normal strength/tone [No Rash] : no rash [Coordination Grossly Intact] : coordination grossly intact [No Focal Deficits] : no focal deficits [Normal Gait] : normal gait [Speech Grossly Normal] : speech grossly normal [Normal Affect] : the affect was normal [Alert and Oriented x3] : oriented to person, place, and time [Normal Mood] : the mood was normal [Stool Occult Blood] : stool negative for occult blood [de-identified] : female in stated age,

## 2020-03-06 NOTE — ASSESSMENT
[FreeTextEntry1] : Patient is overdue for all HCM tests.  I gave her prescription to repeat breast imaging studies and DEXA scan.  She will follow-up with GI to have repeat colonoscopy.  Patient is not interested in repeating her Pap smear.  She was reminded to have routine eye exam, dental care and skin exam with dermatologist.

## 2020-03-06 NOTE — HEALTH RISK ASSESSMENT
[Good] : ~his/her~  mood as  good [Never (0 pts)] : Never (0 points) [No] : In the past 12 months have you used drugs other than those required for medical reasons? No [No falls in past year] : Patient reported no falls in the past year [0] : 2) Feeling down, depressed, or hopeless: Not at all (0) [None] : None [With Family] : lives with family [# of Members in Household ___] :  household currently consist of [unfilled] member(s) [Retired] : retired [High School] : high school [] :  [# Of Children ___] : has [unfilled] children [Feels Safe at Home] : Feels safe at home [Fully functional (bathing, dressing, toileting, transferring, walking, feeding)] : Fully functional (bathing, dressing, toileting, transferring, walking, feeding) [Fully functional (using the telephone, shopping, preparing meals, housekeeping, doing laundry, using] : Fully functional and needs no help or supervision to perform IADLs (using the telephone, shopping, preparing meals, housekeeping, doing laundry, using transportation, managing medications and managing finances) [Smoke Detector] : smoke detector [Carbon Monoxide Detector] : carbon monoxide detector [Seat Belt] :  uses seat belt [With Patient/Caregiver] : With Patient/Caregiver [] : No [Audit-CScore] : 0 [de-identified] : very active, no formal exercise [[id number]] : 0 [LowDoseCTScan] : 03/19 [EyeExamDate] : 09/19 [Change in mental status noted] : No change in mental status noted [Reports changes in hearing] : Reports no changes in hearing [Reports changes in vision] : Reports no changes in vision [Reports changes in dental health] : Reports no changes in dental health [MammogramDate] : 10/18 [MammogramComments] : US breast - 10/2018 [PapSmearDate] : >10 years [BoneDensityDate] : 05/18 [ColonoscopyDate] : 05/14 [de-identified] : dentist - 09/19, 1 X per year [AdvancecareDate] : 03/20

## 2020-03-06 NOTE — HISTORY OF PRESENT ILLNESS
[de-identified] : Pt presented for PE.  Last PE was 2/2018.\par \par She's late for PE because she had a lot of family issues, and her  was very sick, and has been busy with doctor's visit.\par \par She got flu vaccine at the pharmacy.  She has had both pneumonia vaccines with pulmonologist.\par \carole Pt had bronchitis in 11/2019, and she was treated by pulmonologist, and was better.  She had f/u with pulmonologist, and had PFT last week, and was told she has improved from the year before.\par \par Pt also saw  for urinary incontinence, and was found to have a dropped bladder, and was referred to another urologist, but she hasn't gone for f/u.  She has mild urinary incontinence.\par \carole Pt has had recurrent oral thrush, and has been treated by myself, dentist and pulmonologist many times over the past year, and is looking to have ENT evaluation.

## 2020-03-06 NOTE — REVIEW OF SYSTEMS
[Postnasal Drip] : postnasal drip [Heartburn] : heartburn [Incontinence] : incontinence [Easy Bruising] : easy bruising [Negative] : Heme/Lymph [Fever] : no fever [Chills] : no chills [Fatigue] : no fatigue [Recent Change In Weight] : ~T no recent weight change [Chest Pain] : no chest pain [Palpitations] : no palpitations [Lower Ext Edema] : no lower extremity edema [Shortness Of Breath] : no shortness of breath [Wheezing] : no wheezing [Cough] : no cough [Dyspnea on Exertion] : no dyspnea on exertion [Abdominal Pain] : no abdominal pain [Nausea] : no nausea [Constipation] : no constipation [Diarrhea] : diarrhea [Vomiting] : no vomiting [Melena] : no melena [Dysuria] : no dysuria [Nocturia] : no nocturia [Hematuria] : no hematuria [Joint Pain] : no joint pain [Joint Stiffness] : no joint stiffness [Joint Swelling] : no joint swelling [Muscle Pain] : no muscle pain [Back Pain] : no back pain [Itching] : no itching [Mole Changes] : no mole changes [Skin Rash] : no skin rash [Headache] : no headache [Dizziness] : no dizziness [Fainting] : no fainting [Unsteady Walking] : no ataxia [Insomnia] : no insomnia [Anxiety] : no anxiety [Depression] : no depression [Easy Bleeding] : no easy bleeding [FreeTextEntry3] : wears corrective lens [FreeTextEntry4] : Has recurrent oral thrush, [FreeTextEntry7] : Still has occ heartburn, [FreeTextEntry8] : as in HPI, also has nocturia of 1 X per night,  [FreeTextEntry9] : Has arthritis in hands,  [de-identified] : Pt is on warfarin,

## 2020-12-08 ENCOUNTER — NON-APPOINTMENT (OUTPATIENT)
Age: 75
End: 2020-12-08

## 2020-12-21 PROBLEM — Z12.11 ENCOUNTER FOR SCREENING COLONOSCOPY: Status: RESOLVED | Noted: 2018-09-14 | Resolved: 2020-12-21

## 2021-08-02 ENCOUNTER — APPOINTMENT (OUTPATIENT)
Dept: INTERNAL MEDICINE | Facility: CLINIC | Age: 76
End: 2021-08-02
Payer: MEDICARE

## 2021-08-02 VITALS
HEART RATE: 84 BPM | TEMPERATURE: 98.1 F | DIASTOLIC BLOOD PRESSURE: 82 MMHG | HEIGHT: 67 IN | SYSTOLIC BLOOD PRESSURE: 156 MMHG | WEIGHT: 153 LBS | OXYGEN SATURATION: 94 % | BODY MASS INDEX: 24.01 KG/M2

## 2021-08-02 DIAGNOSIS — Z87.898 PERSONAL HISTORY OF OTHER SPECIFIED CONDITIONS: ICD-10-CM

## 2021-08-02 DIAGNOSIS — Z87.19 PERSONAL HISTORY OF OTHER DISEASES OF THE DIGESTIVE SYSTEM: ICD-10-CM

## 2021-08-02 DIAGNOSIS — Z86.19 PERSONAL HISTORY OF OTHER INFECTIOUS AND PARASITIC DISEASES: ICD-10-CM

## 2021-08-02 DIAGNOSIS — Z00.00 ENCOUNTER FOR GENERAL ADULT MEDICAL EXAMINATION W/OUT ABNORMAL FINDINGS: ICD-10-CM

## 2021-08-02 DIAGNOSIS — R10.11 RIGHT UPPER QUADRANT PAIN: ICD-10-CM

## 2021-08-02 DIAGNOSIS — Z87.01 PERSONAL HISTORY OF PNEUMONIA (RECURRENT): ICD-10-CM

## 2021-08-02 DIAGNOSIS — Z87.448 PERSONAL HISTORY OF OTHER DISEASES OF URINARY SYSTEM: ICD-10-CM

## 2021-08-02 DIAGNOSIS — J06.9 ACUTE UPPER RESPIRATORY INFECTION, UNSPECIFIED: ICD-10-CM

## 2021-08-02 PROCEDURE — 99214 OFFICE O/P EST MOD 30 MIN: CPT | Mod: 25

## 2021-08-02 PROCEDURE — 36415 COLL VENOUS BLD VENIPUNCTURE: CPT

## 2021-08-02 PROCEDURE — G0439: CPT

## 2021-08-02 RX ORDER — POLYETHYLENE GLYCOL-3350, SODIUM CHLORIDE, POTASSIUM CHLORIDE AND SODIUM BICARBONATE 420; 11.2; 5.72; 1.48 G/438.4G; G/438.4G; G/438.4G; G/438.4G
420 POWDER, FOR SOLUTION ORAL
Qty: 1 | Refills: 0 | Status: DISCONTINUED | COMMUNITY
Start: 2018-09-14 | End: 2021-08-02

## 2021-08-02 RX ORDER — WARFARIN SODIUM 6 MG/1
6 TABLET ORAL
Refills: 0 | Status: ACTIVE | COMMUNITY

## 2021-08-02 RX ORDER — WARFARIN SODIUM 6 MG/1
6 TABLET ORAL
Refills: 0 | Status: DISCONTINUED | COMMUNITY
End: 2021-08-02

## 2021-08-02 RX ORDER — CLOTRIMAZOLE 10 MG/1
10 LOZENGE ORAL DAILY
Qty: 70 | Refills: 0 | Status: DISCONTINUED | COMMUNITY
Start: 2018-07-30 | End: 2021-08-02

## 2021-08-02 RX ORDER — WARFARIN SODIUM 5 MG/1
5 TABLET ORAL EVERY OTHER DAY
Refills: 0 | Status: DISCONTINUED | COMMUNITY
Start: 2018-01-11 | End: 2021-08-02

## 2021-08-02 RX ORDER — LATANOPROST/PF 0.005 %
0.01 DROPS OPHTHALMIC (EYE)
Qty: 2 | Refills: 0 | Status: ACTIVE | COMMUNITY
Start: 2021-07-26

## 2021-08-02 RX ORDER — UMECLIDINIUM 62.5 UG/1
62.5 AEROSOL, POWDER ORAL TWICE DAILY
Refills: 0 | Status: DISCONTINUED | COMMUNITY
Start: 2020-03-06 | End: 2021-08-02

## 2021-08-02 RX ORDER — TIOTROPIUM BROMIDE 18 UG/1
18 CAPSULE ORAL; RESPIRATORY (INHALATION)
Qty: 90 | Refills: 0 | Status: DISCONTINUED | COMMUNITY
Start: 2021-04-20

## 2021-08-02 RX ORDER — TIOTROPIUM BROMIDE 18 UG/1
18 CAPSULE ORAL; RESPIRATORY (INHALATION)
Refills: 0 | Status: ACTIVE | COMMUNITY

## 2021-08-02 RX ORDER — AMMONIUM LACTATE 12 %
12 CREAM (GRAM) TOPICAL
Qty: 385 | Refills: 0 | Status: ACTIVE | COMMUNITY
Start: 2021-04-15

## 2021-08-05 NOTE — HISTORY OF PRESENT ILLNESS
[de-identified] : 74 y/o female patient is new to the office presents today for AWV/establish care:\par - HCM: DEXA done 6/2020 Osteopenia, Mammogram - 12/2020 WNL, last colonoscopy 5/2014 no malignancy \par - f/u HTN/hyerlipidemia - following with cardiology at [facility], seen on Friday, no changes to medications, EKG stable, carotid Doppler stable, last stress test/echo approx. 2 years ago, check labs today \par - f/u COPD - stable, no recent exacerbations, following with pulmonology\par - hx of SLE/antiphospholipid syndrome - patient stable on Coumadin managed by Rheumatology

## 2021-08-05 NOTE — HEALTH RISK ASSESSMENT
[] : No [No] : In the past 12 months have you used drugs other than those required for medical reasons? No [No falls in past year] : Patient reported no falls in the past year [0] : 2) Feeling down, depressed, or hopeless: Not at all (0) [PHQ-2 Negative - No further assessment needed] : PHQ-2 Negative - No further assessment needed [[id number]] : 0 [Patient reported mammogram was normal] : Patient reported mammogram was normal [Patient reported bone density results were abnormal] : Patient reported bone density results were abnormal [Patient reported colonoscopy was normal] : Patient reported colonoscopy was normal [HIV test declined] : HIV test declined [Hepatitis C test declined] : Hepatitis C test declined [Change in mental status noted] : No change in mental status noted [Language] : denies difficulty with language [Behavior] : denies difficulty with behavior [Learning/Retaining New Information] : denies difficulty learning/retaining new information [Handling Complex Tasks] : denies difficulty handling complex tasks [Reasoning] : denies difficulty with reasoning [Spatial Ability and Orientation] : denies difficulty with spatial ability and orientation [With Family] : lives with family [Retired] : retired [Fully functional (bathing, dressing, toileting, transferring, walking, feeding)] : Fully functional (bathing, dressing, toileting, transferring, walking, feeding) [Fully functional (using the telephone, shopping, preparing meals, housekeeping, doing laundry, using] : Fully functional and needs no help or supervision to perform IADLs (using the telephone, shopping, preparing meals, housekeeping, doing laundry, using transportation, managing medications and managing finances) [Reports changes in hearing] : Reports no changes in hearing [Reports changes in vision] : Reports no changes in vision [Reports normal functional visual acuity (ie: able to read med bottle)] : Reports normal functional visual acuity [Reports changes in dental health] : Reports no changes in dental health [MammogramDate] : 12/2020 [BoneDensityDate] : 06/2020 [BoneDensityComments] : Osteopenia  [ColonoscopyDate] : 05/2014

## 2021-08-06 LAB
25(OH)D3 SERPL-MCNC: 49.7 NG/ML
ALBUMIN SERPL ELPH-MCNC: 4.7 G/DL
ALP BLD-CCNC: 60 U/L
ALT SERPL-CCNC: 21 U/L
ANION GAP SERPL CALC-SCNC: 12 MMOL/L
APPEARANCE: ABNORMAL
AST SERPL-CCNC: 31 U/L
BACTERIA: ABNORMAL
BASOPHILS # BLD AUTO: 0.03 K/UL
BASOPHILS NFR BLD AUTO: 0.5 %
BILIRUB SERPL-MCNC: 1.1 MG/DL
BILIRUBIN URINE: NEGATIVE
BLOOD URINE: NEGATIVE
BUN SERPL-MCNC: 17 MG/DL
CALCIUM SERPL-MCNC: 10.5 MG/DL
CHLORIDE SERPL-SCNC: 95 MMOL/L
CHOLEST SERPL-MCNC: 168 MG/DL
CO2 SERPL-SCNC: 27 MMOL/L
COLOR: YELLOW
CREAT SERPL-MCNC: 0.77 MG/DL
CREAT SPEC-SCNC: 64 MG/DL
EOSINOPHIL # BLD AUTO: 0.06 K/UL
EOSINOPHIL NFR BLD AUTO: 0.9 %
ESTIMATED AVERAGE GLUCOSE: 114 MG/DL
FOLATE SERPL-MCNC: >20 NG/ML
GLUCOSE QUALITATIVE U: NEGATIVE
GLUCOSE SERPL-MCNC: 91 MG/DL
HBA1C MFR BLD HPLC: 5.6 %
HCT VFR BLD CALC: 40.2 %
HDLC SERPL-MCNC: 89 MG/DL
HGB BLD-MCNC: 12.4 G/DL
HYALINE CASTS: 1 /LPF
IMM GRANULOCYTES NFR BLD AUTO: 0.2 %
INR PPP: 2.07 RATIO
KETONES URINE: NEGATIVE
LDLC SERPL CALC-MCNC: 60 MG/DL
LEUKOCYTE ESTERASE URINE: ABNORMAL
LYMPHOCYTES # BLD AUTO: 1.65 K/UL
LYMPHOCYTES NFR BLD AUTO: 25.1 %
MAGNESIUM SERPL-MCNC: 1.8 MG/DL
MAN DIFF?: NORMAL
MCHC RBC-ENTMCNC: 30 PG
MCHC RBC-ENTMCNC: 30.8 GM/DL
MCV RBC AUTO: 97.1 FL
MICROALBUMIN 24H UR DL<=1MG/L-MCNC: 1.6 MG/DL
MICROALBUMIN/CREAT 24H UR-RTO: 25 MG/G
MICROSCOPIC-UA: NORMAL
MONOCYTES # BLD AUTO: 0.51 K/UL
MONOCYTES NFR BLD AUTO: 7.8 %
NEUTROPHILS # BLD AUTO: 4.31 K/UL
NEUTROPHILS NFR BLD AUTO: 65.5 %
NITRITE URINE: NEGATIVE
NONHDLC SERPL-MCNC: 79 MG/DL
PH URINE: 7
PLATELET # BLD AUTO: 207 K/UL
POTASSIUM SERPL-SCNC: 4.1 MMOL/L
PROT SERPL-MCNC: 7.1 G/DL
PROTEIN URINE: NORMAL
PT BLD: 23.6 SEC
RBC # BLD: 4.14 M/UL
RBC # FLD: 13.6 %
RED BLOOD CELLS URINE: 2 /HPF
SODIUM SERPL-SCNC: 134 MMOL/L
SPECIFIC GRAVITY URINE: 1.01
SQUAMOUS EPITHELIAL CELLS: 3 /HPF
TRIGL SERPL-MCNC: 96 MG/DL
UROBILINOGEN URINE: NORMAL
VIT B12 SERPL-MCNC: 968 PG/ML
WBC # FLD AUTO: 6.57 K/UL
WHITE BLOOD CELLS URINE: 6 /HPF

## 2022-02-08 ENCOUNTER — APPOINTMENT (OUTPATIENT)
Dept: INTERNAL MEDICINE | Facility: CLINIC | Age: 77
End: 2022-02-08
Payer: MEDICARE

## 2022-02-08 VITALS
WEIGHT: 160 LBS | OXYGEN SATURATION: 94 % | HEART RATE: 86 BPM | SYSTOLIC BLOOD PRESSURE: 153 MMHG | TEMPERATURE: 98 F | BODY MASS INDEX: 25.11 KG/M2 | DIASTOLIC BLOOD PRESSURE: 84 MMHG | HEIGHT: 67 IN

## 2022-02-08 DIAGNOSIS — R92.8 OTHER ABNORMAL AND INCONCLUSIVE FINDINGS ON DIAGNOSTIC IMAGING OF BREAST: ICD-10-CM

## 2022-02-08 PROCEDURE — 99214 OFFICE O/P EST MOD 30 MIN: CPT

## 2022-02-08 RX ORDER — MECLIZINE HYDROCHLORIDE 25 MG/1
25 TABLET ORAL
Qty: 30 | Refills: 0 | Status: DISCONTINUED | COMMUNITY
Start: 2021-12-21

## 2022-02-08 RX ORDER — IBUPROFEN 600 MG/1
600 TABLET, FILM COATED ORAL
Qty: 30 | Refills: 0 | Status: DISCONTINUED | COMMUNITY
Start: 2021-12-21

## 2022-02-08 RX ORDER — WARFARIN SODIUM 1 MG/1
1 TABLET ORAL
Qty: 90 | Refills: 0 | Status: DISCONTINUED | COMMUNITY
Start: 2022-01-29

## 2022-02-28 NOTE — PHYSICAL EXAM
[No Carotid Bruits] : no carotid bruits [No Abdominal Bruit] : a ~M bruit was not heard ~T in the abdomen [No Edema] : there was no peripheral edema [Normal] : affect was normal and insight and judgment were intact

## 2022-02-28 NOTE — HISTORY OF PRESENT ILLNESS
[de-identified] : 75 y/o female patient presents today:\par - f/u HTN//hyperlipidemia- BP elevated today, compliant with medications, denies any CP/SOB/Palpitations/edema\par - f/u COPD- stable, no recent exacerbations, cont current medications, following with pulmonology \par - f/u SLE/antiphospholipid syndrome - stable, following with Rheumatology, INR stable on Comadin \par

## 2022-08-09 ENCOUNTER — APPOINTMENT (OUTPATIENT)
Dept: INTERNAL MEDICINE | Facility: CLINIC | Age: 77
End: 2022-08-09

## 2022-08-09 VITALS
RESPIRATION RATE: 16 BRPM | HEART RATE: 85 BPM | WEIGHT: 147 LBS | HEIGHT: 67 IN | DIASTOLIC BLOOD PRESSURE: 80 MMHG | BODY MASS INDEX: 23.07 KG/M2 | SYSTOLIC BLOOD PRESSURE: 133 MMHG | OXYGEN SATURATION: 92 % | TEMPERATURE: 98 F

## 2022-08-09 DIAGNOSIS — B37.2 CANDIDIASIS OF SKIN AND NAIL: ICD-10-CM

## 2022-08-09 PROCEDURE — G0439: CPT

## 2022-08-09 PROCEDURE — 36415 COLL VENOUS BLD VENIPUNCTURE: CPT

## 2022-08-09 PROCEDURE — 99213 OFFICE O/P EST LOW 20 MIN: CPT | Mod: 25

## 2022-08-09 RX ORDER — TIZANIDINE 2 MG/1
2 TABLET ORAL
Qty: 30 | Refills: 0 | Status: ACTIVE | COMMUNITY
Start: 2022-07-05

## 2022-08-09 RX ORDER — NYSTATIN AND TRIAMCINOLONE ACETONIDE 100000; 1 [USP'U]/G; MG/G
100000-0.1 OINTMENT TOPICAL
Qty: 1 | Refills: 1 | Status: DISCONTINUED | COMMUNITY
Start: 2022-02-08 | End: 2022-08-09

## 2022-08-09 NOTE — HISTORY OF PRESENT ILLNESS
[de-identified] : 77 y/o female patient presents today for AWV\par - HCM: DEXA 07/2022, mammogram 01/2022\par - f/u HTN/hyperlipidemia/CAD - following with cardiology at [facility], no changes to medications, EKG stable, carotid Doppler stable, check labs today \par - f/u COPD - stable, no recent exacerbations, following with pulmonology\par - hx of SLE/antiphospholipid syndrome - patient stable on Coumadin managed by Rheumatology \par  \par \par

## 2022-08-09 NOTE — HEALTH RISK ASSESSMENT
[Former] : Former [15-19] : 15-19 [No] : In the past 12 months have you used drugs other than those required for medical reasons? No [0] : 2) Feeling down, depressed, or hopeless: Not at all (0) [PHQ-2 Negative - No further assessment needed] : PHQ-2 Negative - No further assessment needed [de-identified] : Quit at age 59, smoked for about 1/2 ppd for 30 years. [[id number]] : 0 [Patient reported mammogram was normal] : Patient reported mammogram was normal [Patient reported bone density results were abnormal] : Patient reported bone density results were abnormal [Change in mental status noted] : No change in mental status noted [None] : None [With Family] : lives with family [Retired] : retired [] :  [Feels Safe at Home] : Feels safe at home [Fully functional (bathing, dressing, toileting, transferring, walking, feeding)] : Fully functional (bathing, dressing, toileting, transferring, walking, feeding) [Fully functional (using the telephone, shopping, preparing meals, housekeeping, doing laundry, using] : Fully functional and needs no help or supervision to perform IADLs (using the telephone, shopping, preparing meals, housekeeping, doing laundry, using transportation, managing medications and managing finances) [Reports changes in hearing] : Reports no changes in hearing [Reports changes in vision] : Reports no changes in vision [Reports changes in dental health] : Reports no changes in dental health [MammogramDate] : 01/2022 [BoneDensityDate] : 07/2022

## 2022-08-09 NOTE — PHYSICAL EXAM
[No Carotid Bruits] : no carotid bruits [No Abdominal Bruit] : a ~M bruit was not heard ~T in the abdomen [No Edema] : there was no peripheral edema [No Rash] : no rash [Normal] : affect was normal and insight and judgment were intact [de-identified] : +area of healing ecchymosis along right anterior lower leg

## 2022-11-14 LAB
25(OH)D3 SERPL-MCNC: 51 NG/ML
ALBUMIN SERPL ELPH-MCNC: 4.6 G/DL
ALP BLD-CCNC: 51 U/L
ALT SERPL-CCNC: 26 U/L
ANION GAP SERPL CALC-SCNC: 12 MMOL/L
APPEARANCE: ABNORMAL
AST SERPL-CCNC: 40 U/L
BACTERIA: ABNORMAL
BASOPHILS # BLD AUTO: 0.04 K/UL
BASOPHILS NFR BLD AUTO: 0.8 %
BILIRUB SERPL-MCNC: 0.9 MG/DL
BILIRUBIN URINE: NEGATIVE
BLOOD URINE: NEGATIVE
BUN SERPL-MCNC: 15 MG/DL
CALCIUM OXALATE CRYSTALS: ABNORMAL
CALCIUM SERPL-MCNC: 10.2 MG/DL
CHLORIDE SERPL-SCNC: 96 MMOL/L
CHOLEST SERPL-MCNC: 165 MG/DL
CO2 SERPL-SCNC: 27 MMOL/L
COLOR: NORMAL
CREAT SERPL-MCNC: 0.77 MG/DL
CREAT SPEC-SCNC: 61 MG/DL
EGFR: 81 ML/MIN/1.73M2
EOSINOPHIL # BLD AUTO: 0.07 K/UL
EOSINOPHIL NFR BLD AUTO: 1.3 %
ESTIMATED AVERAGE GLUCOSE: 120 MG/DL
GLUCOSE QUALITATIVE U: NEGATIVE
GLUCOSE SERPL-MCNC: 86 MG/DL
HBA1C MFR BLD HPLC: 5.8 %
HCT VFR BLD CALC: 43.3 %
HDLC SERPL-MCNC: 89 MG/DL
HGB BLD-MCNC: 12.7 G/DL
HYALINE CASTS: 2 /LPF
IMM GRANULOCYTES NFR BLD AUTO: 0.4 %
KETONES URINE: NEGATIVE
LDLC SERPL CALC-MCNC: 58 MG/DL
LEUKOCYTE ESTERASE URINE: NEGATIVE
LYMPHOCYTES # BLD AUTO: 1.7 K/UL
LYMPHOCYTES NFR BLD AUTO: 32.4 %
MAN DIFF?: NORMAL
MCHC RBC-ENTMCNC: 29.3 GM/DL
MCHC RBC-ENTMCNC: 30 PG
MCV RBC AUTO: 102.1 FL
MICROALBUMIN 24H UR DL<=1MG/L-MCNC: <1.2 MG/DL
MICROALBUMIN/CREAT 24H UR-RTO: NORMAL MG/G
MICROSCOPIC-UA: NORMAL
MONOCYTES # BLD AUTO: 0.55 K/UL
MONOCYTES NFR BLD AUTO: 10.5 %
NEUTROPHILS # BLD AUTO: 2.87 K/UL
NEUTROPHILS NFR BLD AUTO: 54.6 %
NITRITE URINE: NEGATIVE
NONHDLC SERPL-MCNC: 76 MG/DL
PH URINE: 7
PLATELET # BLD AUTO: 185 K/UL
POTASSIUM SERPL-SCNC: 3.8 MMOL/L
PROT SERPL-MCNC: 6.6 G/DL
PROTEIN URINE: NORMAL
RBC # BLD: 4.24 M/UL
RBC # FLD: 14.5 %
RED BLOOD CELLS URINE: 1 /HPF
SODIUM SERPL-SCNC: 135 MMOL/L
SPECIFIC GRAVITY URINE: 1.01
SQUAMOUS EPITHELIAL CELLS: 1 /HPF
TRIGL SERPL-MCNC: 93 MG/DL
UROBILINOGEN URINE: NORMAL
WBC # FLD AUTO: 5.25 K/UL
WHITE BLOOD CELLS URINE: 0 /HPF

## 2023-08-28 ENCOUNTER — APPOINTMENT (OUTPATIENT)
Dept: INTERNAL MEDICINE | Facility: CLINIC | Age: 78
End: 2023-08-28
Payer: MEDICARE

## 2023-08-28 VITALS
WEIGHT: 136 LBS | DIASTOLIC BLOOD PRESSURE: 80 MMHG | TEMPERATURE: 97.8 F | BODY MASS INDEX: 21.86 KG/M2 | OXYGEN SATURATION: 94 % | SYSTOLIC BLOOD PRESSURE: 153 MMHG | HEIGHT: 66 IN | HEART RATE: 78 BPM

## 2023-08-28 VITALS — SYSTOLIC BLOOD PRESSURE: 140 MMHG | DIASTOLIC BLOOD PRESSURE: 82 MMHG

## 2023-08-28 DIAGNOSIS — Z00.00 ENCOUNTER FOR GENERAL ADULT MEDICAL EXAMINATION W/OUT ABNORMAL FINDINGS: ICD-10-CM

## 2023-08-28 DIAGNOSIS — S80.11XA CONTUSION OF RIGHT LOWER LEG, INITIAL ENCOUNTER: ICD-10-CM

## 2023-08-28 DIAGNOSIS — N60.01 SOLITARY CYST OF RIGHT BREAST: ICD-10-CM

## 2023-08-28 DIAGNOSIS — E78.5 HYPERLIPIDEMIA, UNSPECIFIED: ICD-10-CM

## 2023-08-28 DIAGNOSIS — D68.61 ANTIPHOSPHOLIPID SYNDROME: ICD-10-CM

## 2023-08-28 DIAGNOSIS — I45.10 UNSPECIFIED RIGHT BUNDLE-BRANCH BLOCK: ICD-10-CM

## 2023-08-28 DIAGNOSIS — M85.80 OTHER SPECIFIED DISORDERS OF BONE DENSITY AND STRUCTURE, UNSPECIFIED SITE: ICD-10-CM

## 2023-08-28 DIAGNOSIS — I10 ESSENTIAL (PRIMARY) HYPERTENSION: ICD-10-CM

## 2023-08-28 DIAGNOSIS — M32.9 SYSTEMIC LUPUS ERYTHEMATOSUS, UNSPECIFIED: ICD-10-CM

## 2023-08-28 DIAGNOSIS — J44.9 CHRONIC OBSTRUCTIVE PULMONARY DISEASE, UNSPECIFIED: ICD-10-CM

## 2023-08-28 DIAGNOSIS — Z87.891 PERSONAL HISTORY OF NICOTINE DEPENDENCE: ICD-10-CM

## 2023-08-28 DIAGNOSIS — Z79.01 LONG TERM (CURRENT) USE OF ANTICOAGULANTS: ICD-10-CM

## 2023-08-28 DIAGNOSIS — I25.10 ATHEROSCLEROTIC HEART DISEASE OF NATIVE CORONARY ARTERY W/OUT ANGINA PECTORIS: ICD-10-CM

## 2023-08-28 PROCEDURE — G0439: CPT

## 2023-08-28 PROCEDURE — 36415 COLL VENOUS BLD VENIPUNCTURE: CPT

## 2023-08-28 PROCEDURE — 99214 OFFICE O/P EST MOD 30 MIN: CPT | Mod: 25

## 2023-08-28 RX ORDER — NITROFURANTOIN (MONOHYDRATE/MACROCRYSTALS) 25; 75 MG/1; MG/1
100 CAPSULE ORAL
Qty: 10 | Refills: 0 | Status: DISCONTINUED | COMMUNITY
Start: 2023-04-03

## 2023-08-28 RX ORDER — LEVOFLOXACIN 750 MG/1
750 TABLET, FILM COATED ORAL
Qty: 7 | Refills: 0 | Status: DISCONTINUED | COMMUNITY
Start: 2023-04-06

## 2023-08-28 RX ORDER — ALBUTEROL SULFATE 90 UG/1
108 (90 BASE) INHALANT RESPIRATORY (INHALATION)
Qty: 18 | Refills: 0 | Status: ACTIVE | COMMUNITY
Start: 2023-07-10

## 2023-08-28 RX ORDER — ALENDRONATE SODIUM 70 MG/1
70 TABLET ORAL
Qty: 12 | Refills: 0 | Status: ACTIVE | COMMUNITY
Start: 2023-07-03

## 2023-09-01 LAB
25(OH)D3 SERPL-MCNC: 44.5 NG/ML
ALBUMIN SERPL ELPH-MCNC: 4.7 G/DL
ALP BLD-CCNC: 39 U/L
ALT SERPL-CCNC: 24 U/L
ANION GAP SERPL CALC-SCNC: 12 MMOL/L
APPEARANCE: CLEAR
AST SERPL-CCNC: 39 U/L
BACTERIA: ABNORMAL /HPF
BILIRUB SERPL-MCNC: 1 MG/DL
BILIRUBIN URINE: NEGATIVE
BLOOD URINE: NEGATIVE
BUN SERPL-MCNC: 14 MG/DL
CALCIUM OXALATE CRYSTALS: PRESENT
CALCIUM SERPL-MCNC: 9.8 MG/DL
CAST: 1 /LPF
CHLORIDE SERPL-SCNC: 96 MMOL/L
CHOLEST SERPL-MCNC: 159 MG/DL
CO2 SERPL-SCNC: 24 MMOL/L
COLOR: YELLOW
CREAT SERPL-MCNC: 0.66 MG/DL
EGFR: 91 ML/MIN/1.73M2
EPITHELIAL CELLS: 3 /HPF
ESTIMATED AVERAGE GLUCOSE: 117 MG/DL
GLUCOSE QUALITATIVE U: NEGATIVE MG/DL
GLUCOSE SERPL-MCNC: 96 MG/DL
HBA1C MFR BLD HPLC: 5.7 %
HDLC SERPL-MCNC: 87 MG/DL
IRON SATN MFR SERPL: 23 %
IRON SERPL-MCNC: 77 UG/DL
KETONES URINE: NEGATIVE MG/DL
LDLC SERPL CALC-MCNC: 53 MG/DL
LEUKOCYTE ESTERASE URINE: ABNORMAL
MICROSCOPIC-UA: NORMAL
NITRITE URINE: NEGATIVE
NONHDLC SERPL-MCNC: 72 MG/DL
PH URINE: 6.5
POTASSIUM SERPL-SCNC: 4 MMOL/L
PROT SERPL-MCNC: 7 G/DL
PROTEIN URINE: NEGATIVE MG/DL
RED BLOOD CELLS URINE: 3 /HPF
RENAL TUBULAR EPITHELIAL CELLS: PRESENT
REVIEW: NORMAL
SODIUM SERPL-SCNC: 133 MMOL/L
SPECIFIC GRAVITY URINE: 1.01
TIBC SERPL-MCNC: 327 UG/DL
TRIGL SERPL-MCNC: 111 MG/DL
TSH SERPL-ACNC: 1.19 UIU/ML
UIBC SERPL-MCNC: 251 UG/DL
UROBILINOGEN URINE: 0.2 MG/DL
WHITE BLOOD CELLS URINE: 82 /HPF

## 2023-09-25 PROBLEM — I10 ESSENTIAL HYPERTENSION: Status: ACTIVE | Noted: 2018-01-29

## 2023-09-25 PROBLEM — Z87.891 FORMER SMOKER: Status: ACTIVE | Noted: 2017-07-11

## 2023-09-25 PROBLEM — E78.5 HYPERLIPIDEMIA: Status: ACTIVE | Noted: 2017-07-12

## 2023-10-19 ENCOUNTER — NON-APPOINTMENT (OUTPATIENT)
Age: 78
End: 2023-10-19

## 2024-08-30 ENCOUNTER — APPOINTMENT (OUTPATIENT)
Dept: INTERNAL MEDICINE | Facility: CLINIC | Age: 79
End: 2024-08-30
Payer: MEDICARE

## 2024-08-30 VITALS
BODY MASS INDEX: 21.77 KG/M2 | SYSTOLIC BLOOD PRESSURE: 119 MMHG | TEMPERATURE: 97.9 F | DIASTOLIC BLOOD PRESSURE: 72 MMHG | WEIGHT: 135.44 LBS | OXYGEN SATURATION: 96 % | HEIGHT: 66 IN | HEART RATE: 81 BPM

## 2024-08-30 DIAGNOSIS — M32.9 SYSTEMIC LUPUS ERYTHEMATOSUS, UNSPECIFIED: ICD-10-CM

## 2024-08-30 DIAGNOSIS — D68.61 ANTIPHOSPHOLIPID SYNDROME: ICD-10-CM

## 2024-08-30 DIAGNOSIS — Z87.891 PERSONAL HISTORY OF NICOTINE DEPENDENCE: ICD-10-CM

## 2024-08-30 DIAGNOSIS — R73.03 PREDIABETES.: ICD-10-CM

## 2024-08-30 DIAGNOSIS — I25.10 ATHEROSCLEROTIC HEART DISEASE OF NATIVE CORONARY ARTERY W/OUT ANGINA PECTORIS: ICD-10-CM

## 2024-08-30 DIAGNOSIS — Z00.00 ENCOUNTER FOR GENERAL ADULT MEDICAL EXAMINATION W/OUT ABNORMAL FINDINGS: ICD-10-CM

## 2024-08-30 DIAGNOSIS — M85.80 OTHER SPECIFIED DISORDERS OF BONE DENSITY AND STRUCTURE, UNSPECIFIED SITE: ICD-10-CM

## 2024-08-30 DIAGNOSIS — J44.9 CHRONIC OBSTRUCTIVE PULMONARY DISEASE, UNSPECIFIED: ICD-10-CM

## 2024-08-30 DIAGNOSIS — Z79.01 LONG TERM (CURRENT) USE OF ANTICOAGULANTS: ICD-10-CM

## 2024-08-30 DIAGNOSIS — E78.5 HYPERLIPIDEMIA, UNSPECIFIED: ICD-10-CM

## 2024-08-30 DIAGNOSIS — I10 ESSENTIAL (PRIMARY) HYPERTENSION: ICD-10-CM

## 2024-08-30 DIAGNOSIS — I45.10 UNSPECIFIED RIGHT BUNDLE-BRANCH BLOCK: ICD-10-CM

## 2024-08-30 DIAGNOSIS — R92.30 DENSE BREASTS, UNSPECIFIED: ICD-10-CM

## 2024-08-30 DIAGNOSIS — Z87.898 PERSONAL HISTORY OF OTHER SPECIFIED CONDITIONS: ICD-10-CM

## 2024-08-30 LAB
25(OH)D3 SERPL-MCNC: 49.3 NG/ML
ALBUMIN SERPL ELPH-MCNC: 4.7 G/DL
ALP BLD-CCNC: 37 U/L
ALT SERPL-CCNC: 24 U/L
ANION GAP SERPL CALC-SCNC: 14 MMOL/L
APPEARANCE: CLEAR
AST SERPL-CCNC: 43 U/L
BACTERIA: NEGATIVE /HPF
BASOPHILS # BLD AUTO: 0.04 K/UL
BASOPHILS NFR BLD AUTO: 0.7 %
BILIRUB SERPL-MCNC: 1.5 MG/DL
BILIRUBIN URINE: NEGATIVE
BLOOD URINE: NEGATIVE
BUN SERPL-MCNC: 19 MG/DL
CALCIUM SERPL-MCNC: 10.2 MG/DL
CAST: 0 /LPF
CHLORIDE SERPL-SCNC: 98 MMOL/L
CHOLEST SERPL-MCNC: 156 MG/DL
CO2 SERPL-SCNC: 25 MMOL/L
COLOR: YELLOW
CREAT SERPL-MCNC: 0.9 MG/DL
CREAT SPEC-SCNC: 41 MG/DL
CRP SERPL-MCNC: <3 MG/L
EGFR: 66 ML/MIN/1.73M2
EOSINOPHIL # BLD AUTO: 0.09 K/UL
EOSINOPHIL NFR BLD AUTO: 1.6 %
EPITHELIAL CELLS: 0 /HPF
ERYTHROCYTE [SEDIMENTATION RATE] IN BLOOD BY WESTERGREN METHOD: 25 MM/HR
ESTIMATED AVERAGE GLUCOSE: 111 MG/DL
GLUCOSE QUALITATIVE U: NEGATIVE MG/DL
GLUCOSE SERPL-MCNC: 72 MG/DL
HBA1C MFR BLD HPLC: 5.5 %
HCT VFR BLD CALC: 36.9 %
HDLC SERPL-MCNC: 91 MG/DL
HGB BLD-MCNC: 12 G/DL
IMM GRANULOCYTES NFR BLD AUTO: 0.2 %
INR PPP: 1.74 RATIO
IRON SATN MFR SERPL: 31 %
IRON SERPL-MCNC: 90 UG/DL
KETONES URINE: NEGATIVE MG/DL
LDLC SERPL CALC-MCNC: 50 MG/DL
LEUKOCYTE ESTERASE URINE: ABNORMAL
LYMPHOCYTES # BLD AUTO: 1.22 K/UL
LYMPHOCYTES NFR BLD AUTO: 21.3 %
MAN DIFF?: NORMAL
MCHC RBC-ENTMCNC: 30.8 PG
MCHC RBC-ENTMCNC: 32.5 GM/DL
MCV RBC AUTO: 94.6 FL
MICROALBUMIN 24H UR DL<=1MG/L-MCNC: <1.2 MG/DL
MICROALBUMIN/CREAT 24H UR-RTO: NORMAL MG/G
MICROSCOPIC-UA: NORMAL
MONOCYTES # BLD AUTO: 0.58 K/UL
MONOCYTES NFR BLD AUTO: 10.1 %
NEUTROPHILS # BLD AUTO: 3.8 K/UL
NEUTROPHILS NFR BLD AUTO: 66.1 %
NITRITE URINE: NEGATIVE
NONHDLC SERPL-MCNC: 65 MG/DL
PH URINE: 7
PLATELET # BLD AUTO: 208 K/UL
POTASSIUM SERPL-SCNC: 4.5 MMOL/L
PROT SERPL-MCNC: 6.8 G/DL
PROTEIN URINE: NEGATIVE MG/DL
PT BLD: 19.4 SEC
RBC # BLD: 3.9 M/UL
RBC # FLD: 13.1 %
RED BLOOD CELLS URINE: 0 /HPF
SODIUM SERPL-SCNC: 137 MMOL/L
SPECIFIC GRAVITY URINE: 1.01
TIBC SERPL-MCNC: 292 UG/DL
TRIGL SERPL-MCNC: 82 MG/DL
TSH SERPL-ACNC: 1.48 UIU/ML
UIBC SERPL-MCNC: 202 UG/DL
UROBILINOGEN URINE: 0.2 MG/DL
WBC # FLD AUTO: 5.74 K/UL
WHITE BLOOD CELLS URINE: 0 /HPF

## 2024-08-30 PROCEDURE — G0439: CPT

## 2024-08-30 PROCEDURE — 99214 OFFICE O/P EST MOD 30 MIN: CPT | Mod: 25

## 2024-08-30 PROCEDURE — 36415 COLL VENOUS BLD VENIPUNCTURE: CPT

## 2024-08-30 RX ORDER — WARFARIN 5 MG/1
5 TABLET ORAL
Refills: 0 | Status: ACTIVE | COMMUNITY

## 2024-08-30 RX ORDER — WARFARIN 1 MG/1
1 TABLET ORAL
Refills: 0 | Status: ACTIVE | COMMUNITY

## 2024-08-30 RX ORDER — FLUTICASONE FUROATE, UMECLIDINIUM BROMIDE AND VILANTEROL TRIFENATATE 100; 62.5; 25 UG/1; UG/1; UG/1
100-62.5-25 POWDER RESPIRATORY (INHALATION)
Refills: 0 | Status: ACTIVE | COMMUNITY

## 2024-08-30 NOTE — PHYSICAL EXAM
[No Acute Distress] : no acute distress [Well Nourished] : well nourished [Well Developed] : well developed [Well-Appearing] : well-appearing [Normal Sclera/Conjunctiva] : normal sclera/conjunctiva [PERRL] : pupils equal round and reactive to light [EOMI] : extraocular movements intact [Normal Outer Ear/Nose] : the outer ears and nose were normal in appearance [Normal Oropharynx] : the oropharynx was normal [Normal TMs] : both tympanic membranes were normal [Normal Nasal Mucosa] : the nasal mucosa was normal [No JVD] : no jugular venous distention [No Lymphadenopathy] : no lymphadenopathy [Supple] : supple [Thyroid Normal, No Nodules] : the thyroid was normal and there were no nodules present [No Respiratory Distress] : no respiratory distress  [No Accessory Muscle Use] : no accessory muscle use [Clear to Auscultation] : lungs were clear to auscultation bilaterally [Normal Rate] : normal rate  [Regular Rhythm] : with a regular rhythm [Normal S1, S2] : normal S1 and S2 [No Murmur] : no murmur heard [No Carotid Bruits] : no carotid bruits [No Abdominal Bruit] : a ~M bruit was not heard ~T in the abdomen [No Varicosities] : no varicosities [Pedal Pulses Present] : the pedal pulses are present [No Edema] : there was no peripheral edema [No Palpable Aorta] : no palpable aorta [No Extremity Clubbing/Cyanosis] : no extremity clubbing/cyanosis [Soft] : abdomen soft [Non Tender] : non-tender [Non-distended] : non-distended [No Masses] : no abdominal mass palpated [Normal Bowel Sounds] : normal bowel sounds [No HSM] : no HSM [Normal Posterior Cervical Nodes] : no posterior cervical lymphadenopathy [Normal Anterior Cervical Nodes] : no anterior cervical lymphadenopathy [No CVA Tenderness] : no CVA  tenderness [No Spinal Tenderness] : no spinal tenderness [No Joint Swelling] : no joint swelling [Grossly Normal Strength/Tone] : grossly normal strength/tone [Coordination Grossly Intact] : coordination grossly intact [No Rash] : no rash [No Focal Deficits] : no focal deficits [Normal Gait] : normal gait [Deep Tendon Reflexes (DTR)] : deep tendon reflexes were 2+ and symmetric [Speech Grossly Normal] : speech grossly normal [Memory Grossly Normal] : memory grossly normal [Normal Affect] : the affect was normal [Normal Mood] : the mood was normal [Alert and Oriented x3] : oriented to person, place, and time [Normal Insight/Judgement] : insight and judgment were intact

## 2024-08-30 NOTE — HISTORY OF PRESENT ILLNESS
[de-identified] : 77 y/o female patient presents today for AWV - reviewed age appropriate preventive screening exams - reviewed and updated PMH/Medications/Allergies/Surgical hx - f/u HTN/hyperlipidemia/CAD - following with cardiology at [facility], no changes to medications, EKG stable, carotid Doppler stable, check labs today  - f/u COPD - stable, no recent exacerbations, following with pulmonology - hx of SLE/antiphospholipid syndrome - patient stable on Coumadin

## 2024-08-30 NOTE — HEALTH RISK ASSESSMENT
[Yes] : Yes [No] : In the past 12 months have you used drugs other than those required for medical reasons? No [No falls in past year] : Patient reported no falls in the past year [0] : 2) Feeling down, depressed, or hopeless: Not at all (0) [PHQ-2 Negative - No further assessment needed] : PHQ-2 Negative - No further assessment needed [[id number]] : 0 [Former] : Former [15-19] : 15-19 [> 15 Years] : > 15 Years [de-identified] : Quit at age 59, smoked for about 1/2 ppd for 30 years. [Patient reported mammogram was normal] : Patient reported mammogram was normal [Patient reported bone density results were abnormal] : Patient reported bone density results were abnormal [None] : None [Change in mental status noted] : No change in mental status noted [With Family] : lives with family [Retired] : retired [] :  [# Of Children ___] : has [unfilled] children [Sexually Active] : sexually active [High Risk Behavior] : no high risk behavior [Fully functional (bathing, dressing, toileting, transferring, walking, feeding)] : Fully functional (bathing, dressing, toileting, transferring, walking, feeding) [Feels Safe at Home] : Feels safe at home [Fully functional (using the telephone, shopping, preparing meals, housekeeping, doing laundry, using] : Fully functional and needs no help or supervision to perform IADLs (using the telephone, shopping, preparing meals, housekeeping, doing laundry, using transportation, managing medications and managing finances) [Reports changes in hearing] : Reports no changes in hearing [Reports changes in vision] : Reports no changes in vision [Reports changes in dental health] : Reports no changes in dental health [MammogramDate] : 4/2024 [BoneDensityDate] : 2022

## 2024-09-09 ENCOUNTER — TRANSCRIPTION ENCOUNTER (OUTPATIENT)
Age: 79
End: 2024-09-09